# Patient Record
(demographics unavailable — no encounter records)

---

## 2025-06-12 NOTE — PLAN
[FreeTextEntry1] : HCM - labs completed 2 weeks ago with nephro; reviewed  - Tdap 5 yrs ago  - Flu shot September 2024   CKD  - baseline Cr 1.6  - continue losartan 50mg BID  - follows with nephro   HTN  - continue losartan

## 2025-06-12 NOTE — PHYSICAL EXAM
[No Acute Distress] : no acute distress [Well Nourished] : well nourished [Well Developed] : well developed [Well-Appearing] : well-appearing [Normal Sclera/Conjunctiva] : normal sclera/conjunctiva [EOMI] : extraocular movements intact [Normal Outer Ear/Nose] : the outer ears and nose were normal in appearance [Normal Oropharynx] : the oropharynx was normal [No Lymphadenopathy] : no lymphadenopathy [Supple] : supple [Thyroid Normal, No Nodules] : the thyroid was normal and there were no nodules present [No Respiratory Distress] : no respiratory distress  [No Accessory Muscle Use] : no accessory muscle use [Clear to Auscultation] : lungs were clear to auscultation bilaterally [Normal Rate] : normal rate  [Regular Rhythm] : with a regular rhythm [Normal S1, S2] : normal S1 and S2 [No Murmur] : no murmur heard [No Edema] : there was no peripheral edema [Soft] : abdomen soft [Non Tender] : non-tender [Non-distended] : non-distended [No Masses] : no abdominal mass palpated [No HSM] : no HSM [Normal Bowel Sounds] : normal bowel sounds [No Joint Swelling] : no joint swelling [Grossly Normal Strength/Tone] : grossly normal strength/tone [No Rash] : no rash [Coordination Grossly Intact] : coordination grossly intact [No Focal Deficits] : no focal deficits [Normal Gait] : normal gait [Normal Affect] : the affect was normal [Normal Insight/Judgement] : insight and judgment were intact

## 2025-06-12 NOTE — HISTORY OF PRESENT ILLNESS
[de-identified] : 36 yo male with PMHx of HTN, HLD, CKD presents to the office to establish care. Follows with Dr. James Chevalier, Nephro. Seen 2 weeks ago. Exam stable.

## 2025-06-12 NOTE — HEALTH RISK ASSESSMENT
[Very Good] : ~his/her~  mood as very good [Yes] : Yes [2 - 3 times a week (3 pts)] : 2 - 3  times a week (3 points) [5 or 6 (2 pts)] : 5 or 6 (2  points) [Monthly (2 pts)] : Monthly (2 points) [No] : In the past 12 months have you used drugs other than those required for medical reasons? 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

## 2025-06-24 NOTE — DISCUSSION/SUMMARY
[EKG obtained to assist in diagnosis and management of assessed problem(s)] : EKG obtained to assist in diagnosis and management of assessed problem(s) [FreeTextEntry1] : Pt is a 38 y/o M PMH HTN dx 2019, HLD, CKD (unclear etiology, following with renal)  HTN: well controlled c/w losartan 50mg bid Discussed the long-term health risks of uncontrolled BP.  Advised low salt diet, regular exercise, maintaining ideal weight. Encouraged pt to check BP at home and keep journal  Will check transthoracic echocardiogram to evaluate left ventricular function and assess for any structural abnormalities  HLD: start atorvastatin 10 mg qHS Advised lifestyle modifications  repeat labs in 3-4m check Ca score (avoid contrast in the setting of CKD)  CKD: avoid nephrotoxins monitor electrolytes closely Cr 1.6  Most recent available lab results were reviewed with pt. Pt will return in 6 mnths or sooner as needed but I encouraged communication via phone or portal if necessary.  We will call pt with test results when applicable and arrange for expedited follow up if warranted.  The described plan was discussed with the pt.  All questions and concerns were addressed to the best of my knowledge.

## 2025-06-24 NOTE — HISTORY OF PRESENT ILLNESS
[FreeTextEntry1] : Pt is a 36 y/o M who presents today for initial evaluation.  Pt has PMH HTN dx 2019, HLD, CKD (unclear etiology, following with renal) He is feeling well overall - denies CP, SOB, diaphoresis, palpitations, dizziness, syncope, LE edema, PND, orthopnea.   renal Dr Chevalier Previous surgeries: urethral surgery, appy - no problems with anesthesia Family hx: no SCD, mother HLD/HTN, father HLD/HTN Smoking status: never social ETOH no drug use Current exercise: none but active at work Daily water intake: 1-2 gallons Daily caffeine intake: 2 cups coffee Previous cardiac testing: none